# Patient Record
Sex: FEMALE | Race: BLACK OR AFRICAN AMERICAN
[De-identification: names, ages, dates, MRNs, and addresses within clinical notes are randomized per-mention and may not be internally consistent; named-entity substitution may affect disease eponyms.]

---

## 2018-12-13 ENCOUNTER — HOSPITAL ENCOUNTER (EMERGENCY)
Dept: HOSPITAL 92 - ERS | Age: 33
Discharge: HOME | End: 2018-12-13
Payer: COMMERCIAL

## 2018-12-13 DIAGNOSIS — S70.11XA: ICD-10-CM

## 2018-12-13 DIAGNOSIS — S70.12XA: ICD-10-CM
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
## 2018-12-13 DIAGNOSIS — S63.601A: Primary | ICD-10-CM

## 2018-12-13 PROCEDURE — 72170 X-RAY EXAM OF PELVIS: CPT

## 2018-12-13 PROCEDURE — 71045 X-RAY EXAM CHEST 1 VIEW: CPT

## 2018-12-13 NOTE — RAD
CHEST 1 VIEW:

 

Date:  12/13/18 

 

HISTORY:  

Trauma. 

 

COMPARISON:  

None. 

 

FINDINGS:

Lungs are clear. No pneumothorax or effusion. Cardiac silhouette and mediastinal contour within gregorio
l limits. Low grade dextroscoliosis of thoracolumbar junction. No displaced rib fracture. The clavicl
es are intact. 

 

IMPRESSION: 

No acute intrathoracic abnormality. 

 

 

POS: HOME

## 2018-12-13 NOTE — RAD
PELVIS 1 VIEW:

 

Date:  12/13/18 

 

HISTORY:  

Trauma. Pain. 

 

COMPARISON:  

None. 

 

FINDINGS:

Obturator rings are intact. No widening of the pubic symphysis nor the SI joints. Small acetabular os
teophytes bilaterally. Left femoral head/neck ring osteophyte. 

 

IMPRESSION: 

No acute osseous abnormality. 

 

 

POS: HOME

## 2018-12-13 NOTE — RAD
RIGHT HAND 3 VIEWS:

 

Date:  12/13/18 

 

HISTORY:  

Trauma. Pain. Thumb pain. 

 

COMPARISON:  

None. 

 

FINDINGS:

No acute fracture or malalignment. Soft tissues unremarkable. 

 

IMPRESSION: 

No acute fracture or malalignment. 

 

 

POS: HOME